# Patient Record
Sex: MALE | Race: WHITE | Employment: STUDENT | URBAN - METROPOLITAN AREA
[De-identification: names, ages, dates, MRNs, and addresses within clinical notes are randomized per-mention and may not be internally consistent; named-entity substitution may affect disease eponyms.]

---
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
## 2023-01-08 ENCOUNTER — HEALTH MAINTENANCE LETTER (OUTPATIENT)
Age: 18
End: 2023-01-08

## 2023-07-16 ENCOUNTER — HEALTH MAINTENANCE LETTER (OUTPATIENT)
Age: 18
End: 2023-07-16

## 2024-03-23 ENCOUNTER — OFFICE VISIT (OUTPATIENT)
Dept: URGENT CARE | Facility: URGENT CARE | Age: 19
End: 2024-03-23
Payer: COMMERCIAL

## 2024-03-23 VITALS
SYSTOLIC BLOOD PRESSURE: 138 MMHG | HEART RATE: 65 BPM | OXYGEN SATURATION: 100 % | WEIGHT: 196 LBS | TEMPERATURE: 97.5 F | BODY MASS INDEX: 26.69 KG/M2 | RESPIRATION RATE: 20 BRPM | DIASTOLIC BLOOD PRESSURE: 83 MMHG

## 2024-03-23 DIAGNOSIS — T14.8XXA WOUND INFECTION: Primary | ICD-10-CM

## 2024-03-23 DIAGNOSIS — L08.9 WOUND INFECTION: Primary | ICD-10-CM

## 2024-03-23 PROCEDURE — 87070 CULTURE OTHR SPECIMN AEROBIC: CPT | Performed by: STUDENT IN AN ORGANIZED HEALTH CARE EDUCATION/TRAINING PROGRAM

## 2024-03-23 PROCEDURE — 99213 OFFICE O/P EST LOW 20 MIN: CPT | Performed by: STUDENT IN AN ORGANIZED HEALTH CARE EDUCATION/TRAINING PROGRAM

## 2024-03-23 PROCEDURE — 87077 CULTURE AEROBIC IDENTIFY: CPT | Performed by: STUDENT IN AN ORGANIZED HEALTH CARE EDUCATION/TRAINING PROGRAM

## 2024-03-23 PROCEDURE — 87186 SC STD MICRODIL/AGAR DIL: CPT | Performed by: STUDENT IN AN ORGANIZED HEALTH CARE EDUCATION/TRAINING PROGRAM

## 2024-03-23 NOTE — PROGRESS NOTES
Assessment & Plan     Wound infection  Concerned about a wound infection given the soreness of the abdomen and the greenish discharge. Does not appear to be an emergent situation at this time or evidence of an abscess but recommended that he follow up with General Surgery in case there is a wound complication that would require a higher level of intervention. His original surgery was in [place] where he attends school and he is on break and thus unable to follow up with his surgeon at this time. Sent the discharge for wound culture and started patient on Augmentin.   - amoxicillin-clavulanate (AUGMENTIN) 875-125 MG tablet  Dispense: 14 tablet; Refill: 0  - Adult General Surg Referral  - Skin Aerobic Bacterial Culture Routine Without Gram Stain     22 minutes spent by me on the date of the encounter doing chart review, history and exam, documentation and further activities per the note. Billed based on complexity of care.     No follow-ups on file.    Nickie Kendrick MD  [facility] [place]    Subjective     Amr is a 18 year old male who presents to clinic today for the following health issues:  Chief Complaint   Patient presents with    Urgent Care     Present for possible infection at site of the removed sutures from appendectomy procedure that was done over a week ago.   Reports he noticed redness and drainage at wound site.      HPI    Had an appendectomy 4 weeks ago. Feels like his stitches are open. Greenish discharge, gets itchy for 1 week. Starting to feel sore in his right lower abdomen. Denies fevers or chills, nausea or vomiting. No changes in bowel movements. Has not tried any over the counter treatments for this concern. Denies bloating, heart burn. Concerned about elevated blood pressure but decreased into normal range on recheck.     Review of Systems  Constitutional, HEENT, cardiovascular, pulmonary, gi and gu systems are negative, except as otherwise noted.      Objective     /83   Pulse 65   Temp 97.5  F (36.4  C) (Tympanic)   Resp 20   Wt 88.9 kg (196 lb)   SpO2 100%   BMI 26.69 kg/m    Physical Exam   GENERAL: alert and no distress  RESP: lungs clear to auscultation - no rales, rhonchi or wheezes  CV: regular rate and rhythm, normal S1 S2, no S3 or S4, no murmur, click or rub, no peripheral edema  ABDOMEN: soft, nontender, no hepatosplenomegaly, no masses and bowel sounds normal  SKIN: 1.5 cm incision open in the right lower abdomen with greenish yellow discharge and erythematous edges.     Sent for wound culture.

## 2024-03-25 ENCOUNTER — TELEPHONE (OUTPATIENT)
Dept: URGENT CARE | Facility: URGENT CARE | Age: 19
End: 2024-03-25
Payer: COMMERCIAL

## 2024-03-26 ENCOUNTER — OFFICE VISIT (OUTPATIENT)
Dept: SURGERY | Facility: CLINIC | Age: 19
End: 2024-03-26
Attending: STUDENT IN AN ORGANIZED HEALTH CARE EDUCATION/TRAINING PROGRAM
Payer: COMMERCIAL

## 2024-03-26 ENCOUNTER — TELEPHONE (OUTPATIENT)
Dept: FAMILY MEDICINE | Facility: CLINIC | Age: 19
End: 2024-03-26

## 2024-03-26 VITALS
SYSTOLIC BLOOD PRESSURE: 157 MMHG | WEIGHT: 196 LBS | HEART RATE: 77 BPM | TEMPERATURE: 97.9 F | DIASTOLIC BLOOD PRESSURE: 77 MMHG | HEIGHT: 73 IN | BODY MASS INDEX: 25.98 KG/M2

## 2024-03-26 DIAGNOSIS — T14.8XXA WOUND INFECTION: Primary | ICD-10-CM

## 2024-03-26 DIAGNOSIS — L08.9 WOUND INFECTION: Primary | ICD-10-CM

## 2024-03-26 PROCEDURE — 99203 OFFICE O/P NEW LOW 30 MIN: CPT | Performed by: STUDENT IN AN ORGANIZED HEALTH CARE EDUCATION/TRAINING PROGRAM

## 2024-03-26 ASSESSMENT — PAIN SCALES - GENERAL: PAINLEVEL: NO PAIN (0)

## 2024-03-26 NOTE — TELEPHONE ENCOUNTER
Called patient to discuss culture results. It is positive for stenotrophomonas maltophilia and wanted to discuss potentially switching his antibiotic to Bactrim for an additional 3 days from his two days of treatment with the Augmentin. We also could defer to surgery and continue on the same antibiotic depending on the family's preference.     Nickie Kendrick MD

## 2024-03-26 NOTE — PATIENT INSTRUCTIONS
Plan:   1. Continue prescribed course of antibiotics  2. Cover open wound with gauze and tape daily  3. Follow up with general surgeon in [place] for further monitoring of wound

## 2024-03-26 NOTE — PROGRESS NOTES
Surgical Consultation/History and Physical  [facility] General Surgery    Andrés is seen in consultation for surgical site infection at the request of Janell Wagner MD.    Chief Complaint:  surgical site infection    History of Present Illness: Andrés Kelly is a 18 year old male presents with concerns for a surgical site infection.  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  He is now in [place] on spring break.  He noticed that one of the port sites on his right abdomen opened up and he was evaluated at urgent care on 3/23/2024.  There was some discharge at the time which was cultured and he was started on a course of Augmentin.  Since then, he states that the port sites no longer has any surrounding redness and is less tender.  The amount of drainage has decreased significantly.  Denies any fevers or chills, no nausea or emesis, denies any diarrhea.  He overall feels well.    Patient Active Problem List   Diagnosis    Osgood-Schlatter's disease of left lower extremity    Rupture of anterior cruciate ligament of right knee, initial encounter    S/P ACL reconstruction    Status post lateral meniscus repair       Past Medical History:   Diagnosis Date    Osgood-Schlatter's disease     left\. Ff with Ortho and Physical Therapy        Past Surgical History:   Procedure Laterality Date    NO HISTORY OF SURGERY         Family History   Problem Relation Age of Onset    Coronary Artery Disease Paternal Grandfather         fatal MI in his 50s    Diabetes No family hx of     Asthma No family hx of        Social History     Tobacco Use    Smoking status: Never    Smokeless tobacco: Never   Substance Use Topics    Alcohol use: No        History   Drug Use No       Current Outpatient Medications   Medication Sig Dispense Refill    amoxicillin-clavulanate (AUGMENTIN) 875-125 MG tablet Take 1 tablet by mouth 2 times daily for 7 days 14 tablet 0       No Known  "Allergies    Review of Systems:   10 point ROS negative other than what was listed in HPI    Physical Exam:  BP (!) 157/77 (BP Location: Left arm, Patient Position: Sitting, Cuff Size: Adult Regular)   Pulse 77   Temp 97.9  F (36.6  C) (Tympanic)   Ht 1.854 m (6' 1\")   Wt 88.9 kg (196 lb)   BMI 25.86 kg/m      Constitutional- No acute distress, well nourished, non-toxic  Eyes: Anicteric, no injection.  PERRL  ENT:  Normocephalic, atraumatic, Nose midline, moist mucus membranes  Neck - supple, no LAD  Respiratory- Nonlabored breathing on room air  Cardiovascular - Extremities warm and well perfused.  Abdomen - Soft, non-tender, no hepatosplenomegaly, no palpable masses  Neuro - No focal neuro deficits, Alert and oriented x 3  Psych: Appropriate mood and affect  Musculoskeletal: Normal gait, symmetric strength.  FROM upper and lower extremities.  Skin: Warm, Dry, port site on right abdomen with opening of epidermis, no fascial defect. No appreciable bleeding or drainage, no surrounding erythema. Opening is about 1 cm in size, too shallow to pack    30-Day Micro Results       Collected Updated Procedure Result Status      03/23/2024 1625 03/26/2024 1008 Skin Aerobic Bacterial Culture Routine Without Gram Stain [[id number]]   (Abnormal)   Skin from Abdomen    Preliminary result Component Value   Culture 2+ Stenotrophomonas maltophilia  [P]     1+ Staphylococcus epidermidis  [P]     Susceptibilities not routinely done, refer to antibiogram to view typical susceptibility profiles                        Assessment:  1. Andrés Kelly is a 18 year old male who underwent a laparoscopic appendectomy about 1 month ago at an outside hospital in [place] who was referred to surgery clinic here for evaluation of a superficial surgical site infection and superficial dehiscence of one of the port sites on the right abdomen.  He was started on antibiotics with Augmentin, cultures returned positive for stenotrophomonas.  " Clinically he is feeling well with no further GI symptoms and no ongoing drainage from the wound.    He discussed potentially changing the antibiotic regimen based on the cultures.  On 1 hand, switching to a course of Bactrim may be a more targeted approach.  On the other hand, he appears to be doing well and has been tolerating this current regimen without any adverse effects such as nausea or diarrhea.  I offered swapping regimens versus continuing the current course of antibiotics to the patient and his father stating that both felt like a safe approach to me and we agreed on completing the prescribed course of Augmentin.    As of the wound is too shallow to pack, I recommend covering the area with gauze and tape as needed for drainage and advised the patient to follow-up with the surgeon who completed his procedure once he returns back to [place] for ongoing monitoring of the wound.  Explained that it seems small enough that it should heal up on its own with good hygiene, but if it fails to heal then there may be options such as silver nitrate administration to help facilitate wound closure.    Patient and his parents expressed understanding and are amenable to the proposed plan.  Will follow-up with his general surgeon in [place].    Plan:   1. Continue prescribed course of antibiotics  2. Cover open wound with gauze and tape daily  3. Follow up with general surgeon in [place] for further monitoring of wound        Srikanth Braga MD on 3/26/2024 at 5:28 PM

## 2024-03-26 NOTE — LETTER
3/26/2024         RE: Andrés Kelly  [address] [place]  [place] [address]        Dear Colleague,    Thank you for referring your patient, Andrés Kelly, to the [facility]. Please see a copy of my visit note below.    Surgical Consultation/History and Physical  [facility]    Andrés is seen in consultation for surgical site infection at the request of Janell Wagner MD.    Chief Complaint:  surgical site infection    History of Present Illness: Andrés Kelly is a 18 year old male presents with concerns for a surgical site infection.  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  He is now in [place] on spring break.  He noticed that one of the port sites on his right abdomen opened up and he was evaluated at urgent care on 3/23/2024.  There was some discharge at the time which was cultured and he was started on a course of Augmentin.  Since then, he states that the port sites no longer has any surrounding redness and is less tender.  The amount of drainage has decreased significantly.  Denies any fevers or chills, no nausea or emesis, denies any diarrhea.  He overall feels well.    Patient Active Problem List   Diagnosis     Osgood-Schlatter's disease of left lower extremity     Rupture of anterior cruciate ligament of right knee, initial encounter     S/P ACL reconstruction     Status post lateral meniscus repair       Past Medical History:   Diagnosis Date     Osgood-Schlatter's disease     left\. Ff with Ortho and Physical Therapy        Past Surgical History:   Procedure Laterality Date     NO HISTORY OF SURGERY         Family History   Problem Relation Age of Onset     Coronary Artery Disease Paternal Grandfather         fatal MI in his 50s     Diabetes No family hx of      Asthma No family hx of        Social History     Tobacco Use     Smoking status: Never     Smokeless tobacco: Never   Substance Use Topics  "    Alcohol use: No        History   Drug Use No       Current Outpatient Medications   Medication Sig Dispense Refill     amoxicillin-clavulanate (AUGMENTIN) 875-125 MG tablet Take 1 tablet by mouth 2 times daily for 7 days 14 tablet 0       No Known Allergies    Review of Systems:   10 point ROS negative other than what was listed in HPI    Physical Exam:  BP (!) 157/77 (BP Location: Left arm, Patient Position: Sitting, Cuff Size: Adult Regular)   Pulse 77   Temp 97.9  F (36.6  C) (Tympanic)   Ht 1.854 m (6' 1\")   Wt 88.9 kg (196 lb)   BMI 25.86 kg/m      Constitutional- No acute distress, well nourished, non-toxic  Eyes: Anicteric, no injection.  PERRL  ENT:  Normocephalic, atraumatic, Nose midline, moist mucus membranes  Neck - supple, no LAD  Respiratory- Nonlabored breathing on room air  Cardiovascular - Extremities warm and well perfused.  Abdomen - Soft, non-tender, no hepatosplenomegaly, no palpable masses  Neuro - No focal neuro deficits, Alert and oriented x 3  Psych: Appropriate mood and affect  Musculoskeletal: Normal gait, symmetric strength.  FROM upper and lower extremities.  Skin: Warm, Dry, port site on right abdomen with opening of epidermis, no fascial defect. No appreciable bleeding or drainage, no surrounding erythema. Opening is about 1 cm in size, too shallow to pack    30-Day Micro Results       Collected Updated Procedure Result Status      03/23/2024 1625 03/26/2024 1008 Skin Aerobic Bacterial Culture Routine Without Gram Stain [[id number]]   (Abnormal)   Skin from Abdomen    Preliminary result Component Value   Culture 2+ Stenotrophomonas maltophilia  [P]     1+ Staphylococcus epidermidis  [P]     Susceptibilities not routinely done, refer to antibiogram to view typical susceptibility profiles                        Assessment:  1. Andrés Kelly is a 18 year old male who underwent a laparoscopic appendectomy about 1 month ago at an outside hospital in [place] who was referred to " surgery clinic here for evaluation of a superficial surgical site infection and superficial dehiscence of one of the port sites on the right abdomen.  He was started on antibiotics with Augmentin, cultures returned positive for stenotrophomonas.  Clinically he is feeling well with no further GI symptoms and no ongoing drainage from the wound.    He discussed potentially changing the antibiotic regimen based on the cultures.  On 1 hand, switching to a course of Bactrim may be a more targeted approach.  On the other hand, he appears to be doing well and has been tolerating this current regimen without any adverse effects such as nausea or diarrhea.  I offered swapping regimens versus continuing the current course of antibiotics to the patient and his father stating that both felt like a safe approach to me and we agreed on completing the prescribed course of Augmentin.    As of the wound is too shallow to pack, I recommend covering the area with gauze and tape as needed for drainage and advised the patient to follow-up with the surgeon who completed his procedure once he returns back to [place] for ongoing monitoring of the wound.  Explained that it seems small enough that it should heal up on its own with good hygiene, but if it fails to heal then there may be options such as silver nitrate administration to help facilitate wound closure.    Patient and his parents expressed understanding and are amenable to the proposed plan.  Will follow-up with his general surgeon in [place].    Plan:   1. Continue prescribed course of antibiotics  2. Cover open wound with gauze and tape daily  3. Follow up with general surgeon in [place] for further monitoring of wound        Srikanth Braga MD on 3/26/2024 at 5:28 PM      Again, thank you for allowing me to participate in the care of your patient.        Sincerely,        Srikanth Braga MD

## 2024-03-26 NOTE — NURSING NOTE
"Initial BP (!) 157/77 (BP Location: Left arm, Patient Position: Sitting, Cuff Size: Adult Regular)   Pulse 77   Temp 97.9  F (36.6  C) (Tympanic)   Ht 1.854 m (6' 1\")   Wt 88.9 kg (196 lb)   BMI 25.86 kg/m   Estimated body mass index is 25.86 kg/m  as calculated from the following:    Height as of this encounter: 1.854 m (6' 1\").    Weight as of this encounter: 88.9 kg (196 lb). .  Caryl Ortiz MA    "

## 2024-03-26 NOTE — TELEPHONE ENCOUNTER
Provider:  Patient is returning your call. Thank you. Eden Gomez R.N.    This encounter is being handled by a team outside your facility.  If action needs to be taken, please route the encounter back to your team that would normally handle it. Please do not send directly back to the sender. Thank you. Eden Gomez R.N.    Patient is returning a call for the message below.  He reports that he is available at the listed number, but has an appointment at 1:30 for about 1 hour.      Nursing action:  Patient was informed that I will forward this to the provider listed below. If he has not heard anything by this time tomorrow he is to call and follow up. Thank you. Eden Gomez R.N.

## 2024-03-27 LAB
BACTERIA SKIN AEROBE CULT: ABNORMAL
BACTERIA SKIN AEROBE CULT: ABNORMAL

## 2024-09-08 ENCOUNTER — HEALTH MAINTENANCE LETTER (OUTPATIENT)
Age: 19
End: 2024-09-08